# Patient Record
Sex: MALE | Race: WHITE | NOT HISPANIC OR LATINO | Employment: OTHER | URBAN - NONMETROPOLITAN AREA
[De-identification: names, ages, dates, MRNs, and addresses within clinical notes are randomized per-mention and may not be internally consistent; named-entity substitution may affect disease eponyms.]

---

## 2022-08-09 ENCOUNTER — APPOINTMENT (OUTPATIENT)
Dept: CT IMAGING | Facility: HOSPITAL | Age: 52
End: 2022-08-09

## 2022-08-09 ENCOUNTER — HOSPITAL ENCOUNTER (EMERGENCY)
Facility: HOSPITAL | Age: 52
Discharge: HOME | End: 2022-08-09
Attending: EMERGENCY MEDICINE | Admitting: EMERGENCY MEDICINE

## 2022-08-09 VITALS
TEMPERATURE: 98.2 F | HEART RATE: 112 BPM | DIASTOLIC BLOOD PRESSURE: 94 MMHG | SYSTOLIC BLOOD PRESSURE: 146 MMHG | WEIGHT: 240 LBS | HEIGHT: 70 IN | RESPIRATION RATE: 18 BRPM | BODY MASS INDEX: 34.36 KG/M2 | OXYGEN SATURATION: 96 %

## 2022-08-09 DIAGNOSIS — S01.81XA FACIAL LACERATION, INITIAL ENCOUNTER: Primary | ICD-10-CM

## 2022-08-09 PROCEDURE — 72125 CT NECK SPINE W/O DYE: CPT

## 2022-08-09 PROCEDURE — 70450 CT HEAD/BRAIN W/O DYE: CPT | Performed by: RADIOLOGY

## 2022-08-09 PROCEDURE — 99282 EMERGENCY DEPT VISIT SF MDM: CPT

## 2022-08-09 PROCEDURE — 72125 CT NECK SPINE W/O DYE: CPT | Performed by: RADIOLOGY

## 2022-08-09 PROCEDURE — 70450 CT HEAD/BRAIN W/O DYE: CPT

## 2022-08-09 RX ORDER — CEPHALEXIN 500 MG/1
500 CAPSULE ORAL 2 TIMES DAILY
Qty: 20 CAPSULE | Refills: 0 | Status: SHIPPED | OUTPATIENT
Start: 2022-08-09

## 2022-08-09 RX ORDER — LIDOCAINE HYDROCHLORIDE 10 MG/ML
10 INJECTION, SOLUTION EPIDURAL; INFILTRATION; INTRACAUDAL; PERINEURAL ONCE
Status: COMPLETED | OUTPATIENT
Start: 2022-08-09 | End: 2022-08-09

## 2022-08-09 RX ADMIN — LIDOCAINE HYDROCHLORIDE 10 ML: 10 INJECTION, SOLUTION EPIDURAL; INFILTRATION; INTRACAUDAL; PERINEURAL at 12:35

## 2022-08-09 NOTE — ED PROVIDER NOTES
Subjective   This is a 52-year-old male that presents to the emergency department with chief complaint face/head injury times last night.  Patient states he was intoxicated when he fell and hit the bed of his frame.  Patient did sustain a laceration directly above his left eyebrow.  Patient does not recall if he lost consciousness.  Patient denies any other associated complaints at this time.      History provided by:  Patient   used: No    Head Injury  Location:  Generalized  Time since incident:  1 day  Mechanism of injury: fall    Fall:     Fall occurred:  Standing (hit  bed on frame)    Impact surface: bed frame     Point of impact:  Face    Entrapped after fall: no    Pain details:     Quality:  Aching    Duration:  1 day    Timing:  Intermittent    Progression:  Worsening  Chronicity:  New  Relieved by:  Nothing  Worsened by:  Nothing  Ineffective treatments:  None tried  Associated symptoms: no disorientation, no focal weakness, no hearing loss, no loss of consciousness, no memory loss, no nausea, no neck pain, no numbness, no seizures and no tinnitus    Risk factors: no alcohol use and no aspirin use        Review of Systems   Constitutional: Negative.    HENT: Negative.  Negative for congestion, dental problem, drooling, ear pain, hearing loss and tinnitus.    Eyes: Negative.  Negative for photophobia, pain, discharge, redness and itching.   Respiratory: Negative.  Negative for cough, choking and chest tightness.    Cardiovascular: Negative.    Gastrointestinal: Negative.  Negative for abdominal pain, anal bleeding, blood in stool, constipation and nausea.   Endocrine: Negative.  Negative for heat intolerance and polydipsia.   Genitourinary: Negative.  Negative for flank pain, frequency, hematuria and penile discharge.   Musculoskeletal: Negative.  Negative for arthralgias, gait problem, joint swelling, myalgias and neck pain.   Skin: Positive for wound. Negative for pallor and rash.    Neurological: Negative for focal weakness, seizures, loss of consciousness and numbness.   Hematological: Negative.  Negative for adenopathy. Does not bruise/bleed easily.   Psychiatric/Behavioral: Negative.  Negative for behavioral problems, decreased concentration, dysphoric mood, hallucinations and memory loss.   All other systems reviewed and are negative.      No past medical history on file.    No Known Allergies    No past surgical history on file.    No family history on file.    Social History     Socioeconomic History   • Marital status:            Objective   Physical Exam  Vitals and nursing note reviewed.   Constitutional:       General: He is not in acute distress.     Appearance: Normal appearance. He is normal weight. He is not ill-appearing, toxic-appearing or diaphoretic.   HENT:      Head: Normocephalic and atraumatic.        Comments: 7 cm linear laceration. Bleeding controlled.      Right Ear: Tympanic membrane, ear canal and external ear normal. There is no impacted cerumen.      Left Ear: Tympanic membrane, ear canal and external ear normal. There is no impacted cerumen.      Nose: Nose normal. No congestion or rhinorrhea.      Mouth/Throat:      Mouth: Mucous membranes are dry.      Pharynx: Oropharynx is clear. No oropharyngeal exudate or posterior oropharyngeal erythema.   Eyes:      General: No scleral icterus.        Right eye: No discharge.         Left eye: No discharge.      Extraocular Movements: Extraocular movements intact.      Conjunctiva/sclera: Conjunctivae normal.      Pupils: Pupils are equal, round, and reactive to light.   Neck:      Vascular: No carotid bruit.   Cardiovascular:      Rate and Rhythm: Normal rate and regular rhythm.      Pulses: Normal pulses.      Heart sounds: Normal heart sounds. No murmur heard.    No friction rub. No gallop.   Pulmonary:      Effort: Pulmonary effort is normal. No respiratory distress.      Breath sounds: Normal breath sounds.  No stridor. No wheezing, rhonchi or rales.   Chest:      Chest wall: No tenderness.   Abdominal:      General: Abdomen is flat. Bowel sounds are normal. There is no distension.      Palpations: Abdomen is soft. There is no mass.      Tenderness: There is no abdominal tenderness. There is no right CVA tenderness, left CVA tenderness, guarding or rebound.      Hernia: No hernia is present.   Musculoskeletal:         General: No swelling, tenderness, deformity or signs of injury. Normal range of motion.      Cervical back: Normal range of motion and neck supple. No rigidity or tenderness.      Right lower leg: No edema.      Left lower leg: No edema.   Lymphadenopathy:      Cervical: No cervical adenopathy.   Skin:     General: Skin is warm and dry.      Capillary Refill: Capillary refill takes less than 2 seconds.      Coloration: Skin is not jaundiced or pale.      Findings: No bruising, erythema, lesion or rash.   Neurological:      General: No focal deficit present.      Mental Status: He is alert and oriented to person, place, and time. Mental status is at baseline.      Cranial Nerves: No cranial nerve deficit.      Sensory: No sensory deficit.      Motor: No weakness.      Coordination: Coordination normal.      Gait: Gait normal.      Deep Tendon Reflexes: Reflexes normal.   Psychiatric:         Mood and Affect: Mood normal.         Behavior: Behavior normal.         Thought Content: Thought content normal.         Judgment: Judgment normal.         Laceration Repair    Date/Time: 8/9/2022 12:23 PM  Performed by: Salvador Fernández PA-C  Authorized by: Dave Reno MD     Consent:     Consent obtained:  Verbal    Consent given by:  Patient    Risks, benefits, and alternatives were discussed: yes      Risks discussed:  Pain and infection    Alternatives discussed:  No treatment  Universal protocol:     Procedure explained and questions answered to patient or proxy's satisfaction: no      Relevant  documents present and verified: no      Test results available: no      Imaging studies available: no      Required blood products, implants, devices, and special equipment available: no      Site/side marked: no      Immediately prior to procedure, a time out was called: no      Patient identity confirmed:  Verbally with patient  Anesthesia:     Anesthesia method:  Topical application and local infiltration    Local anesthetic:  Lidocaine 1% w/o epi  Laceration details:     Location:  Face    Face location:  L eyebrow    Length (cm):  7    Depth (mm):  10  Exploration:     Limited defect created (wound extended): no      Wound exploration: wound explored through full range of motion      Contaminated: no    Treatment:     Area cleansed with:  Saline, Shur-Clens and soap and water    Amount of cleaning:  Standard    Irrigation solution:  Sterile saline    Irrigation volume:  2000    Irrigation method:  Pressure wash and syringe    Visualized foreign bodies/material removed: no      Debridement:  Moderate    Undermining:  None    Scar revision: no    Skin repair:     Repair method:  Sutures    Suture size:  5-0    Suture material:  Nylon    Suture technique:  Simple interrupted    Number of sutures:  11  Approximation:     Approximation:  Close  Repair type:     Repair type:  Complex  Post-procedure details:     Procedure completion:  Tolerated               ED Course  ED Course as of 08/09/22 1225   Tue Aug 09, 2022   1121 see no acute cervical spine fracture.     IMPRESSION:     1. No acute bony abnormality.     2. Mild arthritic change    []   1121 IMPRESSION:    Unremarkable exam demonstrating no CT evidence of acute intracranial  findings. []      ED Course User Index  [] Salvador Fernández PA-C                                           [facility]    Final diagnoses:   Facial laceration, initial encounter       ED Disposition  ED Disposition     ED Disposition   Discharge    Condition   Stable    Comment   --              Guanako Morris MD  [address] [address]  [phone]    Call in 1 day           Medication List      New Prescriptions    cephalexin 500 MG capsule  Commonly known as: KEFLEX  Take 1 capsule by mouth 2 (Two) Times a Day.           Where to Get Your Medications      These medications were sent to [id number] - [place], [place] - [address] AT [address] [facility] - [phone]  - [phone] [address] [address]    Phone: [phone]   · cephalexin 500 MG capsule          Salvador Fernández PA-C  08/09/22 1225

## 2022-08-09 NOTE — ED NOTES
Irrigated wound above patients left eye with 200ML normal saline irrigation kit with no incident, patient tolerated well. Nurse and provider made aware.